# Patient Record
Sex: MALE | Race: WHITE | Employment: UNEMPLOYED | URBAN - METROPOLITAN AREA
[De-identification: names, ages, dates, MRNs, and addresses within clinical notes are randomized per-mention and may not be internally consistent; named-entity substitution may affect disease eponyms.]

---

## 2023-06-06 ENCOUNTER — HOSPITAL ENCOUNTER (EMERGENCY)
Age: 33
Discharge: HOME | End: 2023-06-06
Attending: EMERGENCY MEDICINE
Payer: COMMERCIAL

## 2023-06-06 VITALS
HEART RATE: 72 BPM | RESPIRATION RATE: 18 BRPM | TEMPERATURE: 98.5 F | HEIGHT: 69 IN | OXYGEN SATURATION: 100 % | SYSTOLIC BLOOD PRESSURE: 116 MMHG | BODY MASS INDEX: 25.27 KG/M2 | WEIGHT: 170.64 LBS | DIASTOLIC BLOOD PRESSURE: 85 MMHG

## 2023-06-06 DIAGNOSIS — S05.02XA ABRASION OF LEFT CORNEA, INITIAL ENCOUNTER: Primary | ICD-10-CM

## 2023-06-06 PROCEDURE — 6370000000 HC RX 637 (ALT 250 FOR IP): Performed by: EMERGENCY MEDICINE

## 2023-06-06 RX ORDER — TETRACAINE HYDROCHLORIDE 5 MG/ML
1 SOLUTION OPHTHALMIC ONCE
Status: COMPLETED | OUTPATIENT
Start: 2023-06-06 | End: 2023-06-06

## 2023-06-06 RX ORDER — CIPROFLOXACIN HYDROCHLORIDE 3.5 MG/ML
SOLUTION/ DROPS TOPICAL
Qty: 2.5 ML | Refills: 0 | Status: SHIPPED | OUTPATIENT
Start: 2023-06-06 | End: 2023-06-16

## 2023-06-06 RX ADMIN — FLUORESCEIN SODIUM 1 MG: 1 STRIP OPHTHALMIC at 13:15

## 2023-06-06 RX ADMIN — TETRACAINE HYDROCHLORIDE 1 DROP: 5 SOLUTION OPHTHALMIC at 13:15

## 2023-06-06 ASSESSMENT — LIFESTYLE VARIABLES
HOW MANY STANDARD DRINKS CONTAINING ALCOHOL DO YOU HAVE ON A TYPICAL DAY: PATIENT DOES NOT DRINK
HOW OFTEN DO YOU HAVE A DRINK CONTAINING ALCOHOL: NEVER

## 2023-06-06 ASSESSMENT — ENCOUNTER SYMPTOMS
EYE DISCHARGE: 0
EYE PAIN: 1
EYE ITCHING: 0
PHOTOPHOBIA: 0
EYE REDNESS: 1

## 2023-06-06 ASSESSMENT — PAIN - FUNCTIONAL ASSESSMENT: PAIN_FUNCTIONAL_ASSESSMENT: NONE - DENIES PAIN

## 2023-06-06 NOTE — ED NOTES
Patient DCed from ED at this time. Discussed AVS, follow up, and scripts. They verbalized understanding. Reinforced that should symptoms persist or worsen to return to the ED. They verbalized understanding. Patient ambulated out of ED. RN thanked patient for choosing [facility] ([facility]).         Melissa Abad RN  06/06/23 2519

## 2023-06-06 NOTE — DISCHARGE INSTRUCTIONS
Follow-up with primary care if not completely better in 3 to 5 days sooner if worse or problems return immediately if any concerns

## 2023-06-06 NOTE — ED PROVIDER NOTES
[facility] ENCOUNTER      Pt Name: Virgie Tucker  MRN: [id number]  [place] 1990  Date of evaluation: 6/6/2023  Provider: Alec Hawk MD    [address] [place] [address]       Chief Complaint   Patient presents with    Eye Pain     Pt was washing his face this morning and feels like he got something in his left eye. C/o redness and irritation. Pt does not wear glassess/contacts. Vision 20/20 bilaterally, 20/20 L eye, 20/20 R eye. HISTORY OF PRESENT ILLNESS   (Location/Symptom, Timing/Onset, Context/Setting, Quality, Duration, Modifying Factors, Severity)  Note limiting factors. Virgie Tucker is a 29 y.o. male who presents to the emergency department with the chief complaint of   Chief Complaint   Patient presents with    Eye Pain     Pt was washing his face this morning and feels like he got something in his left eye. C/o redness and irritation. Pt does not wear glassess/contacts. Vision 20/20 bilaterally, 20/20 L eye, 20/20 R eye. . Patient comes in with his mother. The patient had a traumatic brain injury in 2019 and has persistent stability from that. The patient lives with his mother. He has a brace on his left arm. Patient states he does not remember how he injured himself but his eye started hurting and tearing this morning. Patient does not wear contacts or glasses. Patient has no other complaints or problems. Nursing Notes were reviewed. REVIEW OF SYSTEMS    (2-9 systems for level 4, 10 or more for level 5)     Review of Systems   Constitutional:  Negative for chills and fever. Eyes:  Positive for pain and redness. Negative for photophobia, discharge, itching and visual disturbance. Except as noted above the remainder of the review of systems was reviewed and negative. PAST MEDICAL HISTORY   History reviewed. No pertinent past medical history. SURGICAL HISTORY     History reviewed.  No pertinent